# Patient Record
Sex: MALE | Race: WHITE | URBAN - METROPOLITAN AREA
[De-identification: names, ages, dates, MRNs, and addresses within clinical notes are randomized per-mention and may not be internally consistent; named-entity substitution may affect disease eponyms.]

---

## 2018-08-14 ENCOUNTER — OFFICE VISIT (OUTPATIENT)
Dept: INTERNAL MEDICINE CLINIC | Age: 53
End: 2018-08-14

## 2018-08-14 VITALS
WEIGHT: 176 LBS | HEART RATE: 67 BPM | DIASTOLIC BLOOD PRESSURE: 72 MMHG | SYSTOLIC BLOOD PRESSURE: 130 MMHG | OXYGEN SATURATION: 98 % | BODY MASS INDEX: 25.2 KG/M2 | HEIGHT: 70 IN

## 2018-08-14 DIAGNOSIS — H61.23 BILATERAL IMPACTED CERUMEN: Primary | ICD-10-CM

## 2018-08-14 PROCEDURE — 99999 PR OFFICE/OUTPT VISIT,PROCEDURE ONLY: CPT | Performed by: NURSE PRACTITIONER

## 2018-08-14 PROCEDURE — 69209 REMOVE IMPACTED EAR WAX UNI: CPT | Performed by: NURSE PRACTITIONER

## 2018-08-14 ASSESSMENT — ENCOUNTER SYMPTOMS
WHEEZING: 0
SHORTNESS OF BREATH: 0
COUGH: 0
GASTROINTESTINAL NEGATIVE: 1

## 2018-08-14 ASSESSMENT — PATIENT HEALTH QUESTIONNAIRE - PHQ9
1. LITTLE INTEREST OR PLEASURE IN DOING THINGS: 0
2. FEELING DOWN, DEPRESSED OR HOPELESS: 0
SUM OF ALL RESPONSES TO PHQ QUESTIONS 1-9: 0
SUM OF ALL RESPONSES TO PHQ QUESTIONS 1-9: 0
SUM OF ALL RESPONSES TO PHQ9 QUESTIONS 1 & 2: 0

## 2018-08-14 NOTE — PATIENT INSTRUCTIONS
Patient Education        Earwax Blockage: Care Instructions  Your Care Instructions    Earwax is a natural substance that protects the ear canal. Normally, earwax drains from the ears and does not cause problems. Sometimes earwax builds up and hardens. Earwax blockage (also called cerumen impaction) can cause some loss of hearing and pain. When wax is tightly packed, you will need to have your doctor remove it. Follow-up care is a key part of your treatment and safety. Be sure to make and go to all appointments, and call your doctor if you are having problems. It's also a good idea to know your test results and keep a list of the medicines you take. How can you care for yourself at home? · Do not try to remove earwax with cotton swabs, fingers, or other objects. This can make the blockage worse and damage the eardrum. · If your doctor recommends that you try to remove earwax at home:  ¨ Soften and loosen the earwax with warm mineral oil. You also can try hydrogen peroxide mixed with an equal amount of room temperature water. Place 2 drops of the fluid, warmed to body temperature, in the ear two times a day for up to 5 days. ¨ Once the wax is loose and soft, all that is usually needed to remove it from the ear canal is a gentle, warm shower. Direct the water into the ear, then tip your head to let the earwax drain out. Dry your ear thoroughly with a hair dryer set on low. Hold the dryer several inches from your ear. ¨ If the warm mineral oil and shower do not work, use an over-the-counter wax softener. Read and follow all instructions on the label. After using the wax softener, use an ear syringe to gently flush the ear. Make sure the flushing solution is body temperature. Cool or hot fluids in the ear can cause dizziness. When should you call for help?   Call your doctor now or seek immediate medical care if:    · Pus or blood drains from your ear.     · Your ears are ringing or feel full.     · You have a loss of hearing.    Watch closely for changes in your health, and be sure to contact your doctor if:    · You have pain or reduced hearing after 1 week of home treatment.     · You have any new symptoms, such as nausea or balance problems. Where can you learn more? Go to [email or web address] org and sign in to your [employer] account. Enter O393 in the Odilo box to learn more about \"Earwax Blockage: Care Instructions. \"     If you do not have an account, please click on the \"Sign Up Now\" link. Current as of: November 20, 2017  Content Version: 11.7  © 8529-1068 [employer], [email or web address]. Care instructions adapted under license by [facility] ([facility]). If you have questions about a medical condition or this instruction, always ask your healthcare professional. [address] any warranty or liability for your use of this information.

## 2018-08-14 NOTE — PROGRESS NOTES
SUBJECTIVE:  Zhane Peterson a 45 y.o.male    Chief Complaint   Patient presents with    Ear Fullness     left ear has lost 50% of hearing       Patient presents today for hearing loss more than 50% in both ears. Left worse than right. He states that he has let it go for awhile. He has had this before and it is related to ear wax build up but it has gotten worse so he is here to be evaluated and ears flushed. Denies fever/chills. No shortness of breath. No chest pain. No dizziness. No syncope. No numbness/tingling. No issues with bowel or bladder. No hematuria. No melena. Past Medical History:   Diagnosis Date    Chest pain 06/2013    ER. (--) CTA Chest,EKG,Troponin       Past Surgical History:   Procedure Laterality Date    COLONOSCOPY  7/21/2012    internal hemorrhoids    CYST REMOVAL  2010       Family History   Problem Relation Age of Onset    Cancer Mother         BREAST    High Blood Pressure Mother     Cancer Sister         BREAST       Social History     Social History    Marital status:      Spouse name: N/A    Number of children: N/A    Years of education: N/A     Occupational History    Not on file. Social History Main Topics    Smoking status: Never Smoker    Smokeless tobacco: Never Used    Alcohol use 0.0 oz/week     1 - 2 Glasses of wine per week    Drug use: No    Sexual activity: Not on file     Other Topics Concern    Not on file     Social History Narrative    No narrative on file       Review of Systems   Constitutional: Negative for chills and fever. HENT: Positive for hearing loss (50% bilaterally). Negative for ear discharge and ear pain. Respiratory: Negative for cough, shortness of breath and wheezing. Cardiovascular: Negative for chest pain and palpitations. Gastrointestinal: Negative. Genitourinary: Negative. Musculoskeletal: Negative. Psychiatric/Behavioral: Negative.         OBJECTIVE:  /72 (Site: Right Arm, Position:

## 2018-08-17 ENCOUNTER — OFFICE VISIT (OUTPATIENT)
Dept: INTERNAL MEDICINE CLINIC | Age: 53
End: 2018-08-17

## 2018-08-17 VITALS
HEIGHT: 71 IN | WEIGHT: 175 LBS | DIASTOLIC BLOOD PRESSURE: 74 MMHG | BODY MASS INDEX: 24.5 KG/M2 | SYSTOLIC BLOOD PRESSURE: 120 MMHG

## 2018-08-17 DIAGNOSIS — H61.23 IMPACTED CERUMEN OF BOTH EARS: Primary | ICD-10-CM

## 2018-08-17 PROCEDURE — 99212 OFFICE O/P EST SF 10 MIN: CPT | Performed by: NURSE PRACTITIONER
